# Patient Record
Sex: FEMALE
[De-identification: names, ages, dates, MRNs, and addresses within clinical notes are randomized per-mention and may not be internally consistent; named-entity substitution may affect disease eponyms.]

---

## 2021-06-02 PROBLEM — Z00.00 ENCOUNTER FOR PREVENTIVE HEALTH EXAMINATION: Status: ACTIVE | Noted: 2021-06-02

## 2021-06-09 ENCOUNTER — APPOINTMENT (OUTPATIENT)
Dept: VASCULAR SURGERY | Facility: CLINIC | Age: 66
End: 2021-06-09
Payer: MEDICAID

## 2021-06-09 VITALS
WEIGHT: 165 LBS | HEIGHT: 61 IN | DIASTOLIC BLOOD PRESSURE: 80 MMHG | TEMPERATURE: 97 F | BODY MASS INDEX: 31.15 KG/M2 | SYSTOLIC BLOOD PRESSURE: 130 MMHG

## 2021-06-09 DIAGNOSIS — Z86.79 PERSONAL HISTORY OF OTHER DISEASES OF THE CIRCULATORY SYSTEM: ICD-10-CM

## 2021-06-09 PROCEDURE — 93970 EXTREMITY STUDY: CPT

## 2021-06-09 PROCEDURE — 99205 OFFICE O/P NEW HI 60 MIN: CPT

## 2021-06-09 RX ORDER — ENALAPRIL MALEATE 5 MG/1
TABLET ORAL
Refills: 0 | Status: ACTIVE | COMMUNITY

## 2021-06-09 RX ORDER — METFORMIN HYDROCHLORIDE 625 MG/1
TABLET ORAL
Refills: 0 | Status: ACTIVE | COMMUNITY

## 2021-06-09 NOTE — DATA REVIEWED
[FreeTextEntry1] : Venous duplex right no evidence of acute deep venous thrombosis or superficial femoral phlebitis. All major veins are patent and compressible with normal waveform analysis. The greater saphenous vein is incompetent refill time of 7.2 seconds. The diameter measuring 8.3 mm in the junction and 1.6 mm in the distal thigh. The straight segment visualized from the groin to knee. Neurologic cognitive branches coming off the greater saphenous vein the level of the distal thigh.\par Left there is no evidence of acute deep venous or most distal superficial femoral vulvitis all major veins are patent and compressible with normal waveform analysis. The greater saphenous vein is incompetent refill time of 7.1 seconds the diameter measuring 7.9 mm in the junction of 1.4 cm distal thigh. The straight segment visualized from the groin to knee a large incompetent branches coming off the greater saphenous vein the level of the distal thigh

## 2021-06-09 NOTE — ASSESSMENT
[FreeTextEntry1] : The patient is a 65-year-old female Urdu speaking who presents complaining of bilateral leg pain and heaviness with symptomatic varicose veins. Patient has bilateral venous stasis skin changes and is at risk for venous stasis ulceration. I performed a venous duplex which shows bilateral greater saphenous vein incompetency. I recommend she continue with compression stocking therapy for now and I will schedule her for a left leg endovenous laser ablation followed by a right leg endovenous laser ablation of her incompetent greater saphenous veins.

## 2021-06-09 NOTE — HISTORY OF PRESENT ILLNESS
[FreeTextEntry1] : the patient is a 64 yo female with symptomatic varicose veins to the bilateral lower extremity. She complains of heaviness and fatigability.

## 2021-10-12 ENCOUNTER — APPOINTMENT (OUTPATIENT)
Dept: VASCULAR SURGERY | Facility: CLINIC | Age: 66
End: 2021-10-12
Payer: MEDICAID

## 2021-10-12 PROCEDURE — 36478 ENDOVENOUS LASER 1ST VEIN: CPT | Mod: LT

## 2021-11-10 ENCOUNTER — APPOINTMENT (OUTPATIENT)
Dept: VASCULAR SURGERY | Facility: CLINIC | Age: 66
End: 2021-11-10
Payer: MEDICAID

## 2021-11-10 PROCEDURE — 93971 EXTREMITY STUDY: CPT

## 2021-11-10 PROCEDURE — 99214 OFFICE O/P EST MOD 30 MIN: CPT

## 2022-11-16 ENCOUNTER — APPOINTMENT (OUTPATIENT)
Dept: VASCULAR SURGERY | Facility: CLINIC | Age: 67
End: 2022-11-16

## 2022-12-09 ENCOUNTER — APPOINTMENT (OUTPATIENT)
Dept: VASCULAR SURGERY | Facility: CLINIC | Age: 67
End: 2022-12-09

## 2022-12-09 VITALS — WEIGHT: 160 LBS | BODY MASS INDEX: 30.21 KG/M2 | HEIGHT: 61 IN

## 2022-12-09 VITALS — SYSTOLIC BLOOD PRESSURE: 131 MMHG | DIASTOLIC BLOOD PRESSURE: 81 MMHG

## 2022-12-09 PROCEDURE — 93970 EXTREMITY STUDY: CPT

## 2022-12-09 PROCEDURE — 99213 OFFICE O/P EST LOW 20 MIN: CPT

## 2023-07-14 ENCOUNTER — APPOINTMENT (OUTPATIENT)
Dept: VASCULAR SURGERY | Facility: CLINIC | Age: 68
End: 2023-07-14
Payer: MEDICAID

## 2023-07-14 VITALS — BODY MASS INDEX: 31.41 KG/M2 | WEIGHT: 160 LBS | HEIGHT: 60 IN

## 2023-07-14 VITALS — SYSTOLIC BLOOD PRESSURE: 141 MMHG | DIASTOLIC BLOOD PRESSURE: 74 MMHG

## 2023-07-14 DIAGNOSIS — I83.893 VARICOSE VEINS OF BILATERAL LOWER EXTREMITIES WITH OTHER COMPLICATIONS: ICD-10-CM

## 2023-07-14 PROCEDURE — 93971 EXTREMITY STUDY: CPT | Mod: LT

## 2023-07-14 PROCEDURE — 99214 OFFICE O/P EST MOD 30 MIN: CPT

## 2023-09-26 ENCOUNTER — APPOINTMENT (OUTPATIENT)
Dept: VASCULAR SURGERY | Facility: CLINIC | Age: 68
End: 2023-09-26
Payer: MEDICAID

## 2023-09-26 PROCEDURE — 36475 ENDOVENOUS RF 1ST VEIN: CPT | Mod: RT

## 2023-11-01 ENCOUNTER — APPOINTMENT (OUTPATIENT)
Dept: VASCULAR SURGERY | Facility: CLINIC | Age: 68
End: 2023-11-01

## 2024-02-08 ENCOUNTER — TELEPHONE (OUTPATIENT)
Age: 69
End: 2024-02-08

## 2024-02-08 NOTE — TELEPHONE ENCOUNTER
Called patient to remind of upcoming appointment with [facility] on 2/13/2024- no answer. Left message with appt. Details and [facility] hotline in case that she wants to cancel/reschedule.  Cyndi Taylor

## 2024-02-13 ENCOUNTER — HOSPITAL ENCOUNTER (OUTPATIENT)
Facility: HOSPITAL | Age: 69
Discharge: HOME | End: 2024-02-16

## 2024-02-13 ENCOUNTER — OFFICE VISIT (OUTPATIENT)
Age: 69
End: 2024-02-13

## 2024-02-13 VITALS — WEIGHT: 165 LBS | HEIGHT: 61 IN | BODY MASS INDEX: 31.15 KG/M2

## 2024-02-13 DIAGNOSIS — Z12.31 VISIT FOR SCREENING MAMMOGRAM: ICD-10-CM

## 2024-02-13 DIAGNOSIS — Z01.419 ENCOUNTER FOR WELL WOMAN EXAM: Primary | ICD-10-CM

## 2024-02-13 PROCEDURE — 77063 BREAST TOMOSYNTHESIS BI: CPT

## 2024-02-13 RX ORDER — ATORVASTATIN CALCIUM 10 MG/1
1 TABLET, FILM COATED ORAL
COMMUNITY
Start: 2023-12-04 | End: 2024-03-03

## 2024-02-13 RX ORDER — ENALAPRIL MALEATE 20 MG/1
1 TABLET ORAL
COMMUNITY

## 2024-02-13 NOTE — PROGRESS NOTES
Assessment/Plan:    Cari was seen today for well woman visit.    Diagnoses and all orders for this visit:    Encounter for well woman exam        No follow-up provider specified.    Karen Laguna PA-C  Cari Marte expressed understanding of this plan. An AVS was printed and given to the patient.      ----------------------------------------------------------------------    Chief Complaint   Patient presents with    Well Woman Visit     New patient for screening mammogram only       History of Present Illness:  [facility] annual well woman visit   This is her first appt with us, she recently moved to the area from [place] where she had her prior images  Additionally, she has had a right breast bx but reports that it was benign  She is   Her  is . She has no risk for DV she states  She has no breast concerns or complaints  She is UTD on pap, having had one 2 months ago       No past medical history on file.    Current Outpatient Medications   Medication Sig Dispense Refill    atorvastatin (LIPITOR) 10 MG tablet Take 1 tablet by mouth Every Day      metFORMIN (GLUCOPHAGE) 1000 MG tablet Take 1 tablet by mouth 2 times daily (with meals)      enalapril (VASOTEC) 20 MG tablet Take 1 tablet by mouth Every Day      vitamin D (CHOLECALCIFEROL) 50 MCG ( UT) CAPS capsule Take 1 capsule by mouth daily       No current facility-administered medications for this visit.       No Known Allergies    Social History     Tobacco Use    Smoking status: Never    Smokeless tobacco: Never       No family history on file.    Physical Exam:         A&Ox3  [facility]N NAD  Respirations normal and non labored  Breast exam- shiela neg for mass, tenderness, skin color changes, dimpling or retractions

## 2024-02-13 NOTE — PROGRESS NOTES
[facility]OM[facility] LIFE HISTORY QUESTIONNAIRE       No Yes Comments   Has a doctor ever seen or felt anything wrong with your breast? [x]                                  []                                     Have you ever had a breast biopsy? []                                  [x]                                  Dec. 2022 at [facility] in [place]. Unknown results- pt moved to [facility] and did not have any information about results.         When and where was last mammogram performed?  12/2022- same place as biopsy    Have you ever been told that there was a problem on your mammogram?   No Yes Comments   []                                  [x]                                  See above     Do you have breast implants?   No Yes Comments   [x]                                  []                                       When was your last Pap test performed? 12/2022 at [facility]. Pt declines pelvic exam. UTD on cervical screening per patient.     Have you ever had an abnormal Pap test?   No Yes Comments   [x]                                  []                                       Have you had a hysterectomy?   No Yes Comments (why)   [x]                                  []                                       Have you been through menopause?   No Yes Date of LMP   []                                  [x]                                  At age 45     Did your mother take TARAS?  No Yes Unknown   []                                  []                                  x     Do you have a history of HIV exposure?  No Yes    [x]                                  []                                       Have you ever been diagnosed with any type of Cancer   No Yes Comments (type,when,where,type of treatment   [x]                                  []                                          Has a family member been diagnosed with breast or ovarian cancer?   No Yes Comments (which family members, and type